# Patient Record
Sex: FEMALE | Race: WHITE | HISPANIC OR LATINO | Employment: UNEMPLOYED | URBAN - NONMETROPOLITAN AREA
[De-identification: names, ages, dates, MRNs, and addresses within clinical notes are randomized per-mention and may not be internally consistent; named-entity substitution may affect disease eponyms.]

---

## 2024-02-07 ENCOUNTER — OFFICE VISIT (OUTPATIENT)
Dept: NEUROSURGERY | Facility: CLINIC | Age: 39
End: 2024-02-07
Payer: MEDICAID

## 2024-02-07 VITALS — BODY MASS INDEX: 26.31 KG/M2 | HEIGHT: 62 IN | WEIGHT: 143 LBS

## 2024-02-07 DIAGNOSIS — E66.3 OVERWEIGHT WITH BODY MASS INDEX (BMI) OF 26 TO 26.9 IN ADULT: ICD-10-CM

## 2024-02-07 DIAGNOSIS — Z78.9 NONSMOKER: ICD-10-CM

## 2024-02-07 DIAGNOSIS — M54.12 CERVICAL RADICULOPATHY: ICD-10-CM

## 2024-02-07 DIAGNOSIS — M50.30 DEGENERATION OF CERVICAL INTERVERTEBRAL DISC: ICD-10-CM

## 2024-02-07 DIAGNOSIS — Z72.0 VAPES NICOTINE CONTAINING SUBSTANCE: Primary | ICD-10-CM

## 2024-02-07 RX ORDER — GABAPENTIN 100 MG/1
100 CAPSULE ORAL 3 TIMES DAILY
Qty: 90 CAPSULE | Refills: 2 | Status: SHIPPED | OUTPATIENT
Start: 2024-02-07

## 2024-02-07 RX ORDER — HYDROCODONE BITARTRATE AND ACETAMINOPHEN 5; 325 MG/1; MG/1
TABLET ORAL EVERY 12 HOURS PRN
COMMUNITY

## 2024-02-07 RX ORDER — VENLAFAXINE HYDROCHLORIDE 37.5 MG/1
37.5 CAPSULE, EXTENDED RELEASE ORAL AS NEEDED
COMMUNITY

## 2024-02-07 RX ORDER — OMEPRAZOLE 40 MG/1
40 CAPSULE, DELAYED RELEASE ORAL AS NEEDED
COMMUNITY

## 2024-02-07 RX ORDER — MELOXICAM 7.5 MG/1
7.5 TABLET ORAL AS NEEDED
COMMUNITY

## 2024-02-07 RX ORDER — IBUPROFEN 800 MG/1
800 TABLET ORAL
COMMUNITY

## 2024-02-07 RX ORDER — TIZANIDINE 2 MG/1
TABLET ORAL AS NEEDED
COMMUNITY

## 2024-02-07 NOTE — PROGRESS NOTES
Chief complaint:   Chief Complaint   Patient presents with    Neck Pain     Pt here for constant neck pain with numbness and tinging in bilateral arms and hands. Pt states she has not had any physical therapy, pain mgmt or seen a chiropractor.        Subjective     HPI: This is a 38-year-old female patient who was referred to us by JUAN JOSÉ Cotto for neck pain and left upper extremity pain.  She is here to be evaluated today.  The patient says that her symptoms started in November 2023.  There is no accident or injury associated with this onset of pain.  Currently the pain in her neck is constant.  Is worse with certain positions and better with repositioning.  She does have some intermittent numbness and tingling in her right hand.  She does complain of pain from her neck radiating into her left upper extremity that is constant along with intermittent numbness and tingling in her left hand.  Nothing in particular makes her arm issues better or worse.  She said initially when the onset of pain happened in November that it was very severe for about 20 days but then the pain did seem to ease up.  The pain has not resolved but it is slightly better than what it was.  She denies any bowel or bladder incontinence.  She has not done any recent physical therapy or chiropractic care pain management injections.  She is right-hand dominant.  She is an .  Denies any tobacco, alcohol, illicit drug use.  She is receiving hydrocodone from her primary care doctor since November that she takes around twice a day.  She is also taking ibuprofen and Zanaflex.    Review of Systems   Constitutional:  Positive for activity change.   Musculoskeletal:  Positive for arthralgias, myalgias and neck pain.   Neurological:  Positive for numbness.   Psychiatric/Behavioral: Negative.     All other systems reviewed and are negative.       Past Medical History:   Diagnosis Date    Cervical disc disorder 11.01.2023    Headache 2020  "    History reviewed. No pertinent surgical history.  History reviewed. No pertinent family history.  Social History     Tobacco Use    Smoking status: Never    Smokeless tobacco: Never   Vaping Use    Vaping Use: Every day    Substances: Nicotine    Devices: Disposable   Substance Use Topics    Alcohol use: Never    Drug use: Never     (Not in a hospital admission)    Allergies:  Avocado, Banana, Banana extract allergy skin test, and Kiwi extract    Objective      Vital Signs  Ht 157.5 cm (62\")   Wt 64.9 kg (143 lb)   BMI 26.16 kg/m²     Physical Exam  Constitutional:       Appearance: Normal appearance. She is well-developed.   HENT:      Head: Normocephalic.   Eyes:      General: Lids are normal.      Extraocular Movements: EOM normal.      Conjunctiva/sclera: Conjunctivae normal.      Pupils: Pupils are equal, round, and reactive to light.   Pulmonary:      Effort: Pulmonary effort is normal.      Breath sounds: Normal breath sounds.   Musculoskeletal:         General: Normal range of motion.      Cervical back: Normal range of motion.   Skin:     General: Skin is warm.   Neurological:      Mental Status: She is alert and oriented to person, place, and time.      GCS: GCS eye subscore is 4. GCS verbal subscore is 5. GCS motor subscore is 6.      Cranial Nerves: No cranial nerve deficit.      Sensory: No sensory deficit.      Motor: Motor strength is normal.     Gait: Gait is intact.      Deep Tendon Reflexes: Reflexes are normal and symmetric. Reflexes normal.   Psychiatric:         Speech: Speech normal.         Behavior: Behavior normal.         Thought Content: Thought content normal.         Neurologic Exam     Mental Status   Oriented to person, place, and time.   Attention: normal. Concentration: normal.   Speech: speech is normal   Level of consciousness: alert  Normal comprehension.     Cranial Nerves     CN II   Visual fields full to confrontation.     CN III, IV, VI   Pupils are equal, round, and " reactive to light.  Extraocular motions are normal.     CN V   Facial sensation intact.     CN VII   Facial expression full, symmetric.     CN VIII   CN VIII normal.     CN IX, X   CN IX normal.   CN X normal.     CN XI   CN XI normal.     CN XII   CN XII normal.     Motor Exam   Muscle bulk: normal    Strength   Strength 5/5 throughout.     Sensory Exam   Light touch normal.     Gait, Coordination, and Reflexes     Gait  Gait: normal    Reflexes   Reflexes 2+ except as noted.       Imaging review: MRI of the cervical spine that was done on December 18, 2023 shows loss of the normal cervical curvature.  At C4-5 there is mild left-sided foraminal narrowing.  At C5-6 central canal narrowing to a mild degree and moderate bilateral foraminal narrowing.  At C 6-7 small disc protrusion off to the left side coming in contact with the exiting nerve root.  No fracture visualized.  No cord signal change.    X-ray of the cervical spine that was done on February 7, 2023 shows disc degeneration present at C4-5 and C5-6.  Spurring is also noted at these levels as well.    Assessment/Plan: Patient is complaining of neck pain and left upper extremity pain.  I think this is related to the disc protrusion at C6-7.  I am going to start her on gabapentin and see if this will help with her pain issues.   For first line conservative care of cervical pain, I would like to send Ms. Badillo for a dedicated course of physician directed physical therapy consisting of 2-3 times a week for 4-6 weeks.    Return for reassessment with me after 6-8 weeks after physical therapy.     I advised the patient to call and return sooner for new or worsening complaints of weakness, paresthesias, gait disturbances, or any additional concerns.  Treatment options discussed in detail with Surinder and the patient voiced understanding.  Ms. Badillo agrees with this plan of care.     Patient is a nonsmoker  The patient's Body mass index is 26.16 kg/m².. BMI is above  normal parameters. Recommendations include: educational material and nutrition counseling    Diagnoses and all orders for this visit:    1. Vapes nicotine containing substance (Primary)    2. Degeneration of cervical intervertebral disc  -     Ambulatory Referral to Physical Therapy Evaluate and treat, Neuro; Strengthening, ROM, Stretching; Full weight bearing  -     gabapentin (Neurontin) 100 MG capsule; Take 1 capsule by mouth 3 (Three) Times a Day.  Dispense: 90 capsule; Refill: 2    3. Cervical radiculopathy    4. Overweight with body mass index (BMI) of 26 to 26.9 in adult    5. Nonsmoker    Other orders  -     SCANNED - IMAGING          I discussed the patients findings and my recommendations with patient    Dillon KAMRYN Looney, APRN  02/07/24  12:56 CST

## 2024-04-02 NOTE — PROGRESS NOTES
Chief complaint:   Chief Complaint   Patient presents with    Neck Pain     Pt here for 6-8wk f/u. Pt states since her last office visit her symptoms have improved.         Subjective     HPI: This is a 38-year-old female patient who was referred to us by JUAN JOSÉ Cotto for neck pain and left upper extremity pain. She is here to be evaluated today. The patient says that her symptoms started in November 2023. There is no accident or injury associated with this onset of pain. Currently the pain in her neck is constant. Is worse with certain positions and better with repositioning. She does have some intermittent numbness and tingling in her right hand. She does complain of pain from her neck radiating into her left upper extremity that is constant along with intermittent numbness and tingling in her left hand. Nothing in particular makes her arm issues better or worse. She said initially when the onset of pain happened in November that it was very severe for about 20 days but then the pain did seem to ease up. The pain has not resolved but it is slightly better than what it was. She denies any bowel or bladder incontinence. She has not done any recent  chiropractic care or pain management injections. She is right-hand dominant. She is an . Denies any tobacco, alcohol, illicit drug use. She is receiving hydrocodone from her primary care doctor since November that she takes around twice a day. She is also taking ibuprofen and Zanaflex.     I did send the patient for dedicated course of physical therapy and started her on gabapentin.  She comes in today for routine follow-up.  The patient comes in today and says that she has been able to work with therapy and does like she is making improvements in her symptoms.  She says that she has had improvement in her range of motion.  She is no longer having any pain or numbness and tingling in her left upper extremity.  She does still have some mild pain in her  "neck which is improving.  She has been able to go back to work as a .  She is also complaining of some foot pain bilaterally but she would also like to be seen by therapy    Review of Systems   Musculoskeletal:  Positive for arthralgias, myalgias and neck pain.   Neurological: Negative.          Objective      Vital Signs  Ht 157.5 cm (62\")   Wt 64.9 kg (143 lb)   BMI 26.16 kg/m²     Physical Exam  Constitutional:       Appearance: She is well-developed.   HENT:      Head: Normocephalic.   Eyes:      Extraocular Movements: EOM normal.      Pupils: Pupils are equal, round, and reactive to light.   Pulmonary:      Effort: Pulmonary effort is normal.   Musculoskeletal:         General: Normal range of motion.      Cervical back: Normal range of motion.   Skin:     General: Skin is warm.   Neurological:      Mental Status: She is alert and oriented to person, place, and time.      GCS: GCS eye subscore is 4. GCS verbal subscore is 5. GCS motor subscore is 6.      Cranial Nerves: No cranial nerve deficit.      Sensory: No sensory deficit.      Motor: Motor strength is normal.     Gait: Gait is intact. Gait normal.      Deep Tendon Reflexes: Reflexes are normal and symmetric.   Psychiatric:         Speech: Speech normal.         Behavior: Behavior normal.         Thought Content: Thought content normal.         Neurologic Exam     Mental Status   Oriented to person, place, and time.   Attention: normal. Concentration: normal.   Speech: speech is normal   Level of consciousness: alert  Normal comprehension.     Cranial Nerves     CN II   Visual fields full to confrontation.     CN III, IV, VI   Pupils are equal, round, and reactive to light.  Extraocular motions are normal.     CN V   Facial sensation intact.     CN VII   Facial expression full, symmetric.     CN VIII   CN VIII normal.     CN IX, X   CN IX normal.   CN X normal.     CN XI   CN XI normal.     CN XII   CN XII normal.     Motor Exam   Muscle bulk: " normal    Strength   Strength 5/5 throughout.     Sensory Exam   Light touch normal.     Gait, Coordination, and Reflexes     Gait  Gait: normal    Reflexes   Reflexes 2+ except as noted.       Imaging review:  MRI of the cervical spine that was done on December 18, 2023 shows loss of the normal cervical curvature.  At C4-5 there is mild left-sided foraminal narrowing.  At C5-6 central canal narrowing to a mild degree and moderate bilateral foraminal narrowing.  At C 6-7 small disc protrusion off to the left side coming in contact with the exiting nerve root.  No fracture visualized.  No cord signal change.    C6-7       X-ray of the cervical spine that was done on February 7, 2023 shows disc degeneration present at C4-5 and C5-6.  Spurring is also noted at these levels as well.        Assessment/Plan: The patient is doing good on her neck symptoms and her arm symptoms.  She would like to continue with therapy.  I will give her a new order for physical therapy.  She would also like to do therapy for her foot pain as well.  I will give her an order for therapy for her feet as well she is can start trying to come off of the gabapentin and muscle relaxer for her pain.  With the improvement that she is made only to see on an as-needed basis.  She was told to call us if any further problems or concerns.    Patient is a nonsmoker  The patient's Body mass index is 26.16 kg/m².. BMI is above normal parameters. Recommendations include: educational material and nutrition counseling    Diagnoses and all orders for this visit:    1. Degeneration of cervical intervertebral disc (Primary)  -     Ambulatory Referral to Physical Therapy Evaluate and treat, Neuro; Strengthening, ROM, Stretching; Full weight bearing    2. Pain of foot, unspecified laterality  -     Ambulatory Referral to Physical Therapy Evaluate and treat, Neuro; Strengthening, ROM, Stretching; Full weight bearing    3. Cervical radiculopathy    4. Nonsmoker    5.  Overweight with body mass index (BMI) of 26 to 26.9 in adult        I discussed the patients findings and my recommendations with patient  Dillon Looney, JUAN JOSÉ  04/03/24  11:59 CDT

## 2024-04-03 ENCOUNTER — OFFICE VISIT (OUTPATIENT)
Dept: NEUROSURGERY | Facility: CLINIC | Age: 39
End: 2024-04-03
Payer: MEDICAID

## 2024-04-03 VITALS — BODY MASS INDEX: 26.31 KG/M2 | HEIGHT: 62 IN | WEIGHT: 143 LBS

## 2024-04-03 DIAGNOSIS — M50.30 DEGENERATION OF CERVICAL INTERVERTEBRAL DISC: Primary | ICD-10-CM

## 2024-04-03 DIAGNOSIS — E66.3 OVERWEIGHT WITH BODY MASS INDEX (BMI) OF 26 TO 26.9 IN ADULT: ICD-10-CM

## 2024-04-03 DIAGNOSIS — M54.12 CERVICAL RADICULOPATHY: ICD-10-CM

## 2024-04-03 DIAGNOSIS — Z78.9 NONSMOKER: ICD-10-CM

## 2024-04-03 DIAGNOSIS — M79.673 PAIN OF FOOT, UNSPECIFIED LATERALITY: ICD-10-CM

## 2024-04-03 PROCEDURE — 99213 OFFICE O/P EST LOW 20 MIN: CPT | Performed by: NURSE PRACTITIONER

## 2024-04-03 PROCEDURE — 1160F RVW MEDS BY RX/DR IN RCRD: CPT | Performed by: NURSE PRACTITIONER

## 2024-04-03 PROCEDURE — 1159F MED LIST DOCD IN RCRD: CPT | Performed by: NURSE PRACTITIONER

## 2024-04-03 RX ORDER — CYCLOBENZAPRINE HCL 10 MG
10 TABLET ORAL AS NEEDED
COMMUNITY
Start: 2024-03-09